# Patient Record
Sex: MALE | Race: WHITE | NOT HISPANIC OR LATINO | Employment: FULL TIME | URBAN - METROPOLITAN AREA
[De-identification: names, ages, dates, MRNs, and addresses within clinical notes are randomized per-mention and may not be internally consistent; named-entity substitution may affect disease eponyms.]

---

## 2022-02-01 ENCOUNTER — OFFICE VISIT (OUTPATIENT)
Dept: PHYSICAL THERAPY | Facility: CLINIC | Age: 62
End: 2022-02-01
Payer: COMMERCIAL

## 2022-02-01 DIAGNOSIS — G89.29 CHRONIC RIGHT-SIDED LOW BACK PAIN WITHOUT SCIATICA: ICD-10-CM

## 2022-02-01 DIAGNOSIS — M25.551 RIGHT HIP PAIN: Primary | ICD-10-CM

## 2022-02-01 DIAGNOSIS — M54.50 CHRONIC RIGHT-SIDED LOW BACK PAIN WITHOUT SCIATICA: ICD-10-CM

## 2022-02-01 PROCEDURE — 97112 NEUROMUSCULAR REEDUCATION: CPT | Performed by: PHYSICAL THERAPIST

## 2022-02-01 PROCEDURE — 97162 PT EVAL MOD COMPLEX 30 MIN: CPT | Performed by: PHYSICAL THERAPIST

## 2022-02-01 NOTE — PROGRESS NOTES
PT Evaluation     Today's date: 2022  Patient name: Bessie Marshall  : 1960  MRN: [id number]  Referring provider: Self, Referral  Dx:   Encounter Diagnosis     ICD-10-CM    1  Right hip pain  M25 551    2  Chronic right-sided low back pain without sciatica  M54 50     G89 29        Start Time: 1615  Stop Time: 1700  Total time in clinic (min): 45 minutes    Assessment  Assessment details: Bessie Marshall is a pleasant 62 y o  male who presents with c/c of right posterior and lateral hip pain  The primary movement problem is right hip extension abduction dysfuntion resulting in pathoanatomical symptoms of right hip pain and limiting his ability to don/doff socks, exercise or recreation, stand, walk and descend stairs  No further referral appears necessary at this time based upon examination results  The patient's greatest concerns are the pain he is experiencing, worry over not knowing what's wrong and concern at no signs of improvement  Problem List:  1) Significant limitation in right hip mobility   2) Decreased right hip and LE strength     Etiologic factors include repetitive poor body mechanics, poor lower extremity strength and poor hip mobility  Impairments: abnormal gait, abnormal or restricted ROM, activity intolerance, impaired physical strength, lacks appropriate home exercise program, pain with function, poor posture  and poor body mechanics  Understanding of Dx/Px/POC: good   Prognosis: good  Prognosis details: Positive prognostic indicators include positive attitude toward recovery  Negative prognostic indicators include chronicity of symptoms, hypertension, minimal changes in pain with movement and obesity  Goals  Short Term Goals 2-4 wks   1  Pt will be independent with initial HEP   2  Pt will decrease pain levels to >4/10 at worst   3  Pt will improve right hip mobility to equal to the left     Long Term Goals 6-8 wks  1   Pt will be able to ascend/descend stairs in reciprocal pattern   2  Pt will be able to walk >20 minutes without symptoms   3  Pt will improve FOTO score to >58 by d/c      Plan  Plan details: 2x/wk for 4 wks tapering to 1x/wk for additional 1-2 wks if necessary   Patient would benefit from: PT eval and skilled physical therapy  Referral necessary: No  Planned therapy interventions: abdominal trunk stabilization, joint mobilization, manual therapy, balance, neuromuscular re-education, patient education, postural training, therapeutic activities, therapeutic exercise and home exercise program  Frequency: 2x week  Duration in weeks: 6  Plan of Care beginning date: 2022  Plan of Care expiration date: 3/3/2022        Subjective Evaluation    History of Present Illness  Mechanism of injury: Pt presents with c/c of right hip and thigh pain  Reports symptoms began approx 9 months ago with no specific onset of injury noted  Reports pain with walking, stairs, and extended periods of weight bearing  Pain primarily in the right gluteal region with radiating pain reported into the lateral hip and anterior thigh  Had 2 cortisone injections with first one providing some relief, however, no relief with second one  Pt seen by chiropractor for one visit and was referred to PT  Currently reports difficulty walking extended periods of time, stair mobility (descending > ascending), putting on socks/shoes, and transferring sit to stand  Denies disturbed sleep, altered sensation, and change in bowel/bladder function  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[employer], which requires a lot of walking, climbing ladders, and stairs  No recent imaging     Pain  Current pain ratin  At best pain ratin  At worst pain ratin  Location: Right hip and thigh   Quality: discomfort and dull ache  Relieving factors: heat (lidocaine rub)  Aggravating factors: standing, walking, stair climbing and lifting  Progression: no change    Social Support    Employment status: working    Diagnostic Tests  No diagnostic tests performed  Treatments  Previous treatment: chiropractic and injection treatment  Current treatment: physical therapy  Patient Goals  Patient goals for therapy: decreased pain and increased strength  Patient goal: walk without pain, more flexibility, develop exercise regimen         Objective     Tenderness     Right Hip   Tenderness in the greater trochanter and iliac crest      Neurological Testing     Sensation     Lumbar   Left   Intact: light touch    Right   Intact: light touch    Active Range of Motion     Lumbar   Flexion:  WFL  Extension:  Restriction level: minimal  Left lateral flexion:  Restriction level: minimal  Right lateral flexion:  Restriction level: minimal  Left rotation:  WFL  Right rotation:  WFL  Left Hip   Normal active range of motion    Additional Active Range of Motion Details  Decreased pain with hamstring and hip IR mobility with associated pain   Mechanical Assessment    Cervical      Thoracic      Lumbar    Standing flexion: repeated movements   Pain level: increased  Standing extension: repeated movements  Pain location: no change  Right sidegliding: repeated movements  Pain location: no change    Strength/Myotome Testing     Right Hip   Planes of Motion   Extension: 4  Abduction: 4+  Adduction: 4+    Tests     Lumbar     Left   Negative crossed SLR and passive SLR  Right   Negative crossed SLR and passive SLR  Right Hip   Negative AGUSTINA  Ambulation     Observational Gait   Gait: antalgic   Decreased walking speed, right stance time and right step length     Right foot contact pattern: foot flat             Precautions: HTN      Manuals 2/1            Lumbar PA Mobs Gr1-2             Right Hip LAD             Right Hip Manual Stretch                          Neuro Re-Ed             TA Brace             TB Anti Rotation              Prone Alt LE                                                    Education  S/S, POC Ther Ex             Bike              Squat              Standing Hip Abd/Ext              Bridge              LTR             Hip Add Iso             Clamshell                           Ther Activity                                       Gait Training                                       Modalities

## 2022-02-04 ENCOUNTER — OFFICE VISIT (OUTPATIENT)
Dept: PHYSICAL THERAPY | Facility: CLINIC | Age: 62
End: 2022-02-04
Payer: COMMERCIAL

## 2022-02-04 DIAGNOSIS — G89.29 CHRONIC RIGHT-SIDED LOW BACK PAIN WITHOUT SCIATICA: ICD-10-CM

## 2022-02-04 DIAGNOSIS — M54.50 CHRONIC RIGHT-SIDED LOW BACK PAIN WITHOUT SCIATICA: ICD-10-CM

## 2022-02-04 DIAGNOSIS — M25.551 RIGHT HIP PAIN: Primary | ICD-10-CM

## 2022-02-04 PROCEDURE — 97110 THERAPEUTIC EXERCISES: CPT | Performed by: PHYSICAL THERAPIST

## 2022-02-04 PROCEDURE — 97112 NEUROMUSCULAR REEDUCATION: CPT | Performed by: PHYSICAL THERAPIST

## 2022-02-04 PROCEDURE — 97140 MANUAL THERAPY 1/> REGIONS: CPT | Performed by: PHYSICAL THERAPIST

## 2022-02-04 NOTE — PROGRESS NOTES
Daily Note     Today's date: 2022  Patient name: Vladimir Otero  : 1960  MRN: [id number]  Referring provider: Self, Referral  Dx:   Encounter Diagnosis     ICD-10-CM    1  Right hip pain  M25 551    2  Chronic right-sided low back pain without sciatica  M54 50     G89 29        Start Time: 1515  Stop Time: 1600  Total time in clinic (min): 45 minutes    Subjective: Pt reports he had some soreness in the low back at the start of treatment  Objective: See treatment diary below      Assessment: Initiated TE program  Tolerated treatment well  Discomfort noted in the right hip with bridges, decreased with rest  No pain reported with other exercises  Decreased pain and improved gait mechanics noted following LAD of the right hip  Progress as tolerated  Patient would benefit from continued PT      Plan: Continue per plan of care        Precautions: HTN      Manuals 2/ 2/4           Lumbar PA Mobs Gr1-2  KCB           Right Hip LAD  KCB           Right Hip Manual Stretch                          Neuro Re-Ed             TA Brace  20x3" w/ PBall           TB Anti Rotation   10x GTB           Prone Alt LE  10x                                                   Education  S/S, POC            Ther Ex             Bike   L1 5'            Squat              Standing Hip Abd/Ext   20x B           Bridge   20x            LTR  10x w/ Pball           Hip Add Iso  20x3"           Clamshell   20x3" iso           PPU  10x                         Ther Activity                                       Gait Training                                       Modalities

## 2022-02-07 ENCOUNTER — OFFICE VISIT (OUTPATIENT)
Dept: PHYSICAL THERAPY | Facility: CLINIC | Age: 62
End: 2022-02-07
Payer: COMMERCIAL

## 2022-02-07 DIAGNOSIS — G89.29 CHRONIC RIGHT-SIDED LOW BACK PAIN WITHOUT SCIATICA: ICD-10-CM

## 2022-02-07 DIAGNOSIS — M25.551 RIGHT HIP PAIN: Primary | ICD-10-CM

## 2022-02-07 DIAGNOSIS — M54.50 CHRONIC RIGHT-SIDED LOW BACK PAIN WITHOUT SCIATICA: ICD-10-CM

## 2022-02-07 PROCEDURE — 97110 THERAPEUTIC EXERCISES: CPT | Performed by: PHYSICAL THERAPIST

## 2022-02-07 PROCEDURE — 97140 MANUAL THERAPY 1/> REGIONS: CPT | Performed by: PHYSICAL THERAPIST

## 2022-02-07 PROCEDURE — 97112 NEUROMUSCULAR REEDUCATION: CPT | Performed by: PHYSICAL THERAPIST

## 2022-02-07 NOTE — PROGRESS NOTES
Daily Note     Today's date: 2022  Patient name: Vladimir Otero  : 1960  MRN: [id number]  Referring provider: Self, Referral  Dx:   Encounter Diagnosis     ICD-10-CM    1  Right hip pain  M25 551    2  Chronic right-sided low back pain without sciatica  M54 50     G89 29        Start Time: 1700  Stop Time: 1750  Total time in clinic (min): 50 minutes    Subjective: Pt reports no significant increase in symptoms or pain following previous PT session  Objective: See treatment diary below      Assessment: Tolerated treatment well  Continues to report relief with LAD distraction of the right hip, however, no significant change noted with gait mechanics  Noted some discomfort in the left hip with standing hip abd  Tolerated all other exercises well  Continue to progress as tolerated  Patient would benefit from continued PT      Plan: Continue per plan of care        Precautions: HTN      Manuals  2          Lumbar PA Mobs Gr1-2  KCB KCB          Right Hip LAD  KCB KCB          Right Hip Manual Stretch                          Neuro Re-Ed             TA Brace  20x3" w/ PBall 20x3" w/ PBall          TB Anti Rotation   10x GTB 10x GTB          Prone Alt LE  10x  10x           TB Chop    10x GTB                                    Education  S/S, POC            Ther Ex             Bike   L1 5'  L1 7'           Squat              Standing Hip Abd/Ext   20x B 20x B          Bridge   20x  20x           LTR  10x w/ Pball 10x w/ Pball          Hip Add Iso  20x3" 20x3"          Clamshell   20x3" iso 20x3" iso          PPU  10x                         Ther Activity                                       Gait Training                                       Modalities

## 2022-02-10 ENCOUNTER — OFFICE VISIT (OUTPATIENT)
Dept: PHYSICAL THERAPY | Facility: CLINIC | Age: 62
End: 2022-02-10
Payer: COMMERCIAL

## 2022-02-10 DIAGNOSIS — M54.50 CHRONIC RIGHT-SIDED LOW BACK PAIN WITHOUT SCIATICA: ICD-10-CM

## 2022-02-10 DIAGNOSIS — G89.29 CHRONIC RIGHT-SIDED LOW BACK PAIN WITHOUT SCIATICA: ICD-10-CM

## 2022-02-10 DIAGNOSIS — M25.551 RIGHT HIP PAIN: Primary | ICD-10-CM

## 2022-02-10 PROCEDURE — 97140 MANUAL THERAPY 1/> REGIONS: CPT | Performed by: PHYSICAL THERAPIST

## 2022-02-10 PROCEDURE — 97110 THERAPEUTIC EXERCISES: CPT | Performed by: PHYSICAL THERAPIST

## 2022-02-10 PROCEDURE — 97112 NEUROMUSCULAR REEDUCATION: CPT | Performed by: PHYSICAL THERAPIST

## 2022-02-10 NOTE — PROGRESS NOTES
Daily Note     Today's date: 2/10/2022  Patient name: Eston Mcardle  : 1960  MRN: [id number]  Referring provider: Self, Referral  Dx:   Encounter Diagnosis     ICD-10-CM    1  Right hip pain  M25 551    2  Chronic right-sided low back pain without sciatica  M54 50     G89 29        Start Time: 1700  Stop Time: 1745  Total time in clinic (min): 45 minutes    Subjective: Pt reports he saw chiropractor on Wednesday and will continue to see him every other week  No significant increase noted in symptoms following last PT visit  Objective: See treatment diary below      Assessment: Tolerated treatment well  Discomfort noted with standing hip abd, L>R  No significant pain reported with other exercises  Continues to report relief with LAD of the right hip  Progress as tolerated  Patient would benefit from continued PT      Plan: Continue per plan of care        Precautions: HTN      Manuals 2/1 2/4 2/7 2/10         Lumbar PA Mobs Gr1-2  KCB KCB KCB         Right Hip LAD  KCB KCB KCB         Right Hip Manual Stretch                          Neuro Re-Ed             TA Brace  20x3" w/ PBall 20x3" w/ PBall 20x3" w/ PBall         TB Anti Rotation   10x GTB 10x GTB 10x GTB         Prone Alt LE  10x  10x           TB Chop    10x GTB 10x GTB                                   Education  S/S, POC            Ther Ex             Bike   L1 5'  L1 7'  L1 8'          Squat              Standing Hip Abd/Ext   20x B 20x B 20x B         Bridge   20x  20x  20x          LTR  10x w/ Pball 10x w/ Pball          Hip Add Iso  20x3" 20x3" 20x3"         Clamshell   20x3" iso 20x3" iso 20x3" iso         PPU  10x                         Ther Activity                                       Gait Training                                       Modalities

## 2022-02-14 ENCOUNTER — OFFICE VISIT (OUTPATIENT)
Dept: PHYSICAL THERAPY | Facility: CLINIC | Age: 62
End: 2022-02-14
Payer: COMMERCIAL

## 2022-02-14 DIAGNOSIS — M25.551 RIGHT HIP PAIN: Primary | ICD-10-CM

## 2022-02-14 DIAGNOSIS — G89.29 CHRONIC RIGHT-SIDED LOW BACK PAIN WITHOUT SCIATICA: ICD-10-CM

## 2022-02-14 DIAGNOSIS — M54.50 CHRONIC RIGHT-SIDED LOW BACK PAIN WITHOUT SCIATICA: ICD-10-CM

## 2022-02-14 PROCEDURE — 97112 NEUROMUSCULAR REEDUCATION: CPT | Performed by: PHYSICAL THERAPIST

## 2022-02-14 PROCEDURE — 97110 THERAPEUTIC EXERCISES: CPT | Performed by: PHYSICAL THERAPIST

## 2022-02-14 PROCEDURE — 97140 MANUAL THERAPY 1/> REGIONS: CPT | Performed by: PHYSICAL THERAPIST

## 2022-02-14 NOTE — PROGRESS NOTES
Daily Note     Today's date: 2022  Patient name: Vladimir Otero  : 1960  MRN: [id number]  Referring provider: Self, Referral  Dx:   Encounter Diagnosis     ICD-10-CM    1  Right hip pain  M25 551    2  Chronic right-sided low back pain without sciatica  M54 50     G89 29        Start Time: 1700  Stop Time: 1800  Total time in clinic (min): 60 minutes    Subjective: Pt reports he did not do much activity over the weekend and noticed decreased pain in the low back  Notes increased pain in the anterior hip and thigh after work today  Objective: See treatment diary below      Assessment: Tolerated treatment well  Adjusted POC to address pt's concerns of anterior hip pain  Noted some improvement with hip flexor stretch and lumbar extension  Updated HEP with stretches performed during today's session  Progress as tolerated  Educated the patient on progress made thus far with PT, options to discuss with PCP concerning increased pain in the anterior hip and thigh, treatment options, and POC going forward  Pt's questions answered to his satisfaction  Patient would benefit from continued PT      Plan: Continue per plan of care        Precautions: HTN      Manuals 2/1 2/4 2/7 2/10 2/14        Lumbar PA Mobs Gr1-2  KCB KCB KCB         Right Hip LAD  KCB KCB KCB         Right Hip Manual Stretch     KCB        Right Hip Anterior Glide      KCB                     Neuro Re-Ed             TA Brace  20x3" w/ PBall 20x3" w/ PBall 20x3" w/ PBall         TB Anti Rotation   10x GTB 10x GTB 10x GTB         Prone Alt LE  10x  10x           TB Chop    10x GTB 10x GTB                                   Education  S/S, POC    Review  POC, progress  S/S  Update HEP        Ther Ex             Bike   L1 5'  L1 7'  L1 8'  L1 5'         Squat              Standing Hip Abd/Ext   20x B 20x B 20x B         Bridge   20x  20x  20x          LTR  10x w/ Pball 10x w/ Pball          Hip Add Iso  20x3" 20x3" 20x3"         Clamshell   20x3" iso 20x3" iso 20x3" iso         PPU  10x            Standing Hip Flexor Stretch     2x10 5"        Standing Lumbar Ext      10x 5"        Prone Quad Stretch      10x 5"                     Ther Activity                                       Gait Training                                       Modalities

## 2022-02-18 ENCOUNTER — OFFICE VISIT (OUTPATIENT)
Dept: PHYSICAL THERAPY | Facility: CLINIC | Age: 62
End: 2022-02-18
Payer: COMMERCIAL

## 2022-02-18 DIAGNOSIS — M54.50 CHRONIC RIGHT-SIDED LOW BACK PAIN WITHOUT SCIATICA: ICD-10-CM

## 2022-02-18 DIAGNOSIS — G89.29 CHRONIC RIGHT-SIDED LOW BACK PAIN WITHOUT SCIATICA: ICD-10-CM

## 2022-02-18 DIAGNOSIS — M25.551 RIGHT HIP PAIN: Primary | ICD-10-CM

## 2022-02-18 PROCEDURE — 97110 THERAPEUTIC EXERCISES: CPT | Performed by: PHYSICAL THERAPIST

## 2022-02-18 PROCEDURE — 97140 MANUAL THERAPY 1/> REGIONS: CPT | Performed by: PHYSICAL THERAPIST

## 2022-02-18 NOTE — PROGRESS NOTES
Daily Note     Today's date: 2022  Patient name: Kayley Ramon  : 1960  MRN: [id number]  Referring provider: Self, Referral  Dx:   Encounter Diagnosis     ICD-10-CM    1  Right hip pain  M25 551    2  Chronic right-sided low back pain without sciatica  M54 50     G89 29        Start Time: 0900  Stop Time: 0945  Total time in clinic (min): 45 minutes    Subjective: Pt reports some pain in the right hip at the start of treatment  Objective: See treatment diary below      Assessment: Tolerated treatment well  Decreased pain and improved mobility noted following PPU w/ clinician OP  No pain reported with other exercises  Advised to continue with home exercises and stretches  Progress as tolerated  Patient would benefit from continued PT      Plan: Continue per plan of care        Precautions: HTN      Manuals 2/1 2/4 2/7 2/10 2/14 2/18       Lumbar PA Mobs Gr1-2  KCB KCB [place] w/ OP       Right Hip LAD  KCB KCB KCB         Right Hip Manual Stretch     KCB        Right Hip Anterior Glide      KCB                     Neuro Re-Ed             TA Brace  20x3" w/ PBall 20x3" w/ PBall 20x3" w/ PBall  20x3" w/ PBall       TB Anti Rotation   10x GTB 10x GTB 10x GTB         Prone Alt LE  10x  10x           TB Chop    10x GTB 10x GTB                                   Education  S/S, POC    Review  POC, progress  S/S  Update HEP        Ther Ex             Bike   L1 5'  L1 7'  L1 8'  L1 5'  L1 6'        Squat              Standing Hip Abd/Ext   20x B 20x B 20x B         Bridge   20x  20x  20x   20x        LTR  10x w/ Pball 10x w/ Pball   20x w/ Pball       Hip Add Iso  20x3" 20x3" 20x3"  20x3"        Clamshell   20x3" iso 20x3" iso 20x3" iso  20x3" iso       PPU  10x     2x10       Standing Hip Flexor Stretch     2x10 5"        Standing Lumbar Ext      10x 5"        Prone Quad Stretch      10x 5"                     Ther Activity                                       Gait Training Modalities

## 2022-02-21 ENCOUNTER — OFFICE VISIT (OUTPATIENT)
Dept: PHYSICAL THERAPY | Facility: CLINIC | Age: 62
End: 2022-02-21
Payer: COMMERCIAL

## 2022-02-21 DIAGNOSIS — G89.29 CHRONIC RIGHT-SIDED LOW BACK PAIN WITHOUT SCIATICA: ICD-10-CM

## 2022-02-21 DIAGNOSIS — M54.50 CHRONIC RIGHT-SIDED LOW BACK PAIN WITHOUT SCIATICA: ICD-10-CM

## 2022-02-21 DIAGNOSIS — M25.551 RIGHT HIP PAIN: Primary | ICD-10-CM

## 2022-02-21 PROCEDURE — 97110 THERAPEUTIC EXERCISES: CPT | Performed by: PHYSICAL THERAPIST

## 2022-02-21 PROCEDURE — 97140 MANUAL THERAPY 1/> REGIONS: CPT | Performed by: PHYSICAL THERAPIST

## 2022-02-21 NOTE — PROGRESS NOTES
Daily Note     Today's date: 2022  Patient name: Marisol Rocha  : 1960  MRN: [id number]  Referring provider: Self, Referral  Dx:   Encounter Diagnosis     ICD-10-CM    1  Right hip pain  M25 551    2  Chronic right-sided low back pain without sciatica  M54 50     G89 29        Start Time: 1115  Stop Time: 1200  Total time in clinic (min): 45 minutes    Subjective: Pt with no new complaints noted at this time  No significant change in symptoms reported since previous PT visit  Objective: See treatment diary below      Assessment: Tolerated treatment well  No significant symptoms reported with exercises  Occasional cuing required for proper exercise technique  Decreased joint mobility noted in the lumbar spine with some improvement noted with PA mobs  Pt to see PCP this afternoon and POC will be adjusted if needed  Patient would benefit from continued PT      Plan: Continue per plan of care        Precautions: HTN      Manuals 2/1 2/4 2/7 2/10 2/14 2/18 2/21      Lumbar PA Mobs Gr1-2  KCB KCB KCB  KCB w/ OP KCB w/ OP      Right Hip LAD  KCB KCB KCB         Right Hip Manual Stretch     KCB        Right Hip Anterior Glide      KCB                     Neuro Re-Ed             TA Brace  20x3" w/ PBall 20x3" w/ PBall 20x3" w/ PBall  20x3" w/ PBall 20x3" w/ PBall      TB Anti Rotation   10x GTB 10x GTB 10x GTB         Prone Alt LE  10x  10x           TB Chop    10x GTB 10x GTB                                   Education  S/S, POC    Review  POC, progress  S/S  Update HEP        Ther Ex             Bike   L1 5'  L1 7'  L1 8'  L1 5'  L1 6'  L1 6'       Squat              Standing Hip Abd/Ext   20x B 20x B 20x B         Bridge   20x  20x  20x   20x  20x      LTR  10x w/ Pball 10x w/ Pball   20x w/ Pball 20x w/ Pball      Hip Add Iso  20x3" 20x3" 20x3"  20x3"  20x3"       Clamshell   20x3" iso 20x3" iso 20x3" iso  20x3" iso 20x3" iso      PPU  10x     2x10 2x10       Standing Hip Flexor Stretch     2x10 5" Standing Lumbar Ext      10x 5"        Prone Quad Stretch      10x 5"                     Ther Activity                                       Gait Training                                       Modalities

## 2022-02-24 ENCOUNTER — OFFICE VISIT (OUTPATIENT)
Dept: PHYSICAL THERAPY | Facility: CLINIC | Age: 62
End: 2022-02-24
Payer: COMMERCIAL

## 2022-02-24 DIAGNOSIS — M54.50 CHRONIC RIGHT-SIDED LOW BACK PAIN WITHOUT SCIATICA: ICD-10-CM

## 2022-02-24 DIAGNOSIS — G89.29 CHRONIC RIGHT-SIDED LOW BACK PAIN WITHOUT SCIATICA: ICD-10-CM

## 2022-02-24 DIAGNOSIS — M25.551 RIGHT HIP PAIN: Primary | ICD-10-CM

## 2022-02-24 PROCEDURE — 97140 MANUAL THERAPY 1/> REGIONS: CPT | Performed by: PHYSICAL THERAPIST

## 2022-02-24 PROCEDURE — 97110 THERAPEUTIC EXERCISES: CPT | Performed by: PHYSICAL THERAPIST

## 2022-02-24 NOTE — PROGRESS NOTES
Daily Note     Today's date: 2022  Patient name: Tracey Barber  : 1960  MRN: [id number]  Referring provider: Self, Referral  Dx:   Encounter Diagnosis     ICD-10-CM    1  Right hip pain  M25 551    2  Chronic right-sided low back pain without sciatica  M54 50     G89 29        Start Time: 1700  Stop Time: 1745  Total time in clinic (min): 45 minutes    Subjective: Pt reports he continues to have pain in the right low back and anterior hip  Pt saw PCP on Monday, started prednisone dose genet and will f/u with orthopedic surgeon  Objective: See treatment diary below      Assessment: Tolerated treatment well  Reported improvement in anterior hip pain with hip inferior glide and decreased pain in the low back with REIL w/ clinician OP  However, reports symptom improvement is not lasting  No pain reported with exercises  Pt to hold PT at this time and will f/u as needed        Plan: Hold PT     Precautions: HTN      Manuals 2/1 2/4 2/7 2/10 2/14 2/18 2/21 2/24     Lumbar PA Mobs Gr1-2  [address] [place] w/ OP KCB w/ OP KCB w/ OP     Right Hip LAD  KCB KCB KCB         Right Hip Manual Stretch     KCB        Right Hip Anterior Glide      KCB   Inf glide  KCB                  Neuro Re-Ed             TA Brace  20x3" w/ PBall 20x3" w/ PBall 20x3" w/ PBall  20x3" w/ PBall 20x3" w/ PBall 20x3" w/ PBall     TB Anti Rotation   10x GTB 10x GTB 10x GTB         Prone Alt LE  10x  10x           TB Chop    10x GTB 10x GTB                                   Education  S/S, POC    Review  POC, progress  S/S  Update HEP        Ther Ex             Bike   L1 5'  L1 7'  L1 8'  L1 5'  L1 6'  L1 6'  L1 6'     Squat              Standing Hip Abd/Ext   20x B 20x B 20x B         Bridge   20x  20x  20x   20x  20x 20x     LTR  10x w/ Pball 10x w/ Pball   20x w/ Pball 20x w/ Pball 20x w/ Pball     Hip Add Iso  20x3" 20x3" 20x3"  20x3"  20x3"  20x3"      Clamshell   20x3" iso 20x3" iso 20x3" iso  20x3" iso 20x3" iso 20x3" iso     PPU 10x     2x10 2x10  2x10     Standing Hip Flexor Stretch     2x10 5"        Standing Lumbar Ext      10x 5"        Prone Quad Stretch      10x 5"                     Ther Activity                                       Gait Training                                       Modalities

## 2022-03-21 NOTE — PROGRESS NOTES
Pt seen for 8 total sessions of PT with last visit on 2/24/22  At time of last visit, pt requested to hold PT due to minimal progress made  Pt to f/u with ortho surgeon and contact clinic if additional PT services were required   Pt has not contacted clinic at this point and will be d/c from PT

## 2022-07-22 ENCOUNTER — HOSPITAL ENCOUNTER (OUTPATIENT)
Dept: ULTRASOUND IMAGING | Facility: HOSPITAL | Age: 62
Discharge: HOME | End: 2022-07-22
Payer: COMMERCIAL

## 2022-07-22 DIAGNOSIS — R31.1 BENIGN ESSENTIAL MICROSCOPIC HEMATURIA: ICD-10-CM

## 2022-07-22 PROCEDURE — 76770 US EXAM ABDO BACK WALL COMP: CPT

## 2022-08-18 ENCOUNTER — HOSPITAL ENCOUNTER (OUTPATIENT)
Dept: RADIOLOGY | Age: 62
Discharge: HOME | End: 2022-08-18
Payer: COMMERCIAL

## 2022-08-18 DIAGNOSIS — R82.991 HYPOCITRATURIA: ICD-10-CM

## 2022-08-18 DIAGNOSIS — R31.1 BENIGN ESSENTIAL MICROSCOPIC HEMATURIA: ICD-10-CM

## 2022-08-18 PROCEDURE — G1004 CDSM NDSC: HCPCS

## 2022-08-18 PROCEDURE — 74178 CT ABD&PLV WO CNTR FLWD CNTR: CPT

## 2022-08-18 RX ADMIN — IOHEXOL 70 ML: 350 INJECTION, SOLUTION INTRAVENOUS at 09:31

## 2022-11-11 ENCOUNTER — HOSPITAL ENCOUNTER (OUTPATIENT)
Dept: RADIOLOGY | Facility: HOSPITAL | Age: 62
Discharge: HOME | End: 2022-11-11
Attending: ORTHOPAEDIC SURGERY

## 2022-11-11 VITALS
DIASTOLIC BLOOD PRESSURE: 108 MMHG | HEART RATE: 74 BPM | HEIGHT: 73 IN | SYSTOLIC BLOOD PRESSURE: 179 MMHG | BODY MASS INDEX: 48.29 KG/M2

## 2022-11-11 DIAGNOSIS — M54.50 LUMBAR BACK PAIN: ICD-10-CM

## 2022-11-11 DIAGNOSIS — R52 PAIN: Primary | ICD-10-CM

## 2022-11-11 DIAGNOSIS — R52 PAIN: ICD-10-CM

## 2022-11-11 RX ORDER — LIDOCAINE 50 MG/G
1 PATCH TOPICAL DAILY
Qty: 30 PATCH | Refills: 0 | Status: SHIPPED | OUTPATIENT
Start: 2022-11-11

## 2022-11-11 RX ORDER — TAMSULOSIN HYDROCHLORIDE 0.4 MG/1
CAPSULE ORAL
COMMUNITY
Start: 2022-10-02

## 2022-11-11 RX ORDER — LEVOTHYROXINE SODIUM 0.03 MG/1
25 TABLET ORAL DAILY
COMMUNITY
Start: 2022-10-02

## 2022-11-11 RX ORDER — LISINOPRIL 10 MG/1
10 TABLET ORAL DAILY
COMMUNITY

## 2022-11-11 NOTE — LETTER
2022     Ousmane Hay MD  [address]    Patient: Milagros Fu   YOB: 1960   Date of Visit: 2022       Dear Dr Liliam Ang: Thank you for referring Milagros Fu to me for evaluation  Below are my notes for this consultation  If you have questions, please do not hesitate to call me  I look forward to following your patient along with you  Sincerely,        Montez Rivera MD        CC: No Recipients  Montez Rivera MD  2022 10:12 AM  Signed  NAME: Milagros Fu  : 1960  PCP: Ousmane Hay MD      Chief Complaint: low back pain    HPI:  Milagros Fu is a 65 y o  male presenting for initial visit with chief complaint of low back pain  Has had chronic LBP for many years after work injury when in 35s  Notes the last 2 years or so his back pain has increased  Initially his back pain was R>L  Had right hip replacement that decreased his right-sided pain  Notes after his right hip replacement he had a few months off of work and was taking it easy, noting improvement in his symptoms  Now since being back at work, he describes more L>R sided back pain  Pain worse when walking down steps  Prolonged walking > prolonged standing  He is able to walk >5 miles a day  Notes pain radiates from his low back into his hips  Denies radiation into his legs, numbness/tingling, or maggy weakness  Picking box up from floor causes more pain than lifting box from table and bringing overhead  Denies any maggy trauma  Denies fever or chills  Denies any bladder or bowel changes  PMH total hip replacement on 22 at [facility]  MRI lumbar on 2021, images not available for review at today's visit  Used to smoke, quit about 3 years ago  Conservative therapy includes the following:   Prednisone, tramadol in the past with some relief  Currently using lidocaine patches with relief    Injection by Dr Michael Darnell on 22 at [facility], right L2-3 TFESI without relief  Right hip injections 2/5/21 and 8/30/21m, the first injection helped and second injection without benefit  Has been treated with physical therapy in the past without relief  Chiropractor with some relief  Went for spinal decompression recently at chiropractor with some relief  These therapeutic modalities were ineffective  The patient has noticed significant impairment in performing the following ADLs: Sarthak Lott works as a [employer] and has a job in [place], commuting over 100 miles each way  He was out of work for 2 months or so after his hip replacement and notes his symptoms improved  He has been back at work since  Patient is able to function independently and perform ADLs  FAMILY HISTORY   History reviewed  No pertinent family history  PAST MEDICAL HISTORY:   Past Medical History:   Diagnosis Date   • Hypertension    • RACH (obstructive sleep apnea)        MEDICATIONS:  Current Outpatient Medications   Medication Sig Dispense Refill   • levothyroxine 25 mcg tablet Take 25 mcg by mouth daily     • lidocaine (Lidoderm) 5 % Apply 1 patch topically daily Remove & Discard patch within 12 hours or as directed by MD  Do not wear patch for greater than 12 hours at a time (12 hours on, 12 hours off)  30 patch 0   • lisinopril (ZESTRIL) 10 mg tablet Take 10 mg by mouth daily     • tamsulosin (FLOMAX) 0 4 mg take 1 capsule by mouth nightly     • ibuprofen (MOTRIN) 200 mg tablet Take by mouth every 6 (six) hours as needed for mild pain     • loratadine (CLARITIN) 10 mg tablet Take 10 mg by mouth daily       No current facility-administered medications for this visit         PAST SURGICAL HISTORY:  Past Surgical History:   Procedure Laterality Date   • KNEE SURGERY Left     Arthroscopy as a child   • SOFT PALATE REVISION         SOCIAL HISTORY:  Social History     Socioeconomic History   • Marital status: /Civil Union     Spouse name: Not on file   • Number of children: Not on file   • Years of education: Not on file   • Highest education level: Not on file   Occupational History   • Not on file   Tobacco Use   • Smoking status: Never Smoker   • Smokeless tobacco: Current User   • Tobacco comment: red patches   Vaping Use   • Vaping Use: Never used   Substance and Sexual Activity   • Alcohol use: Yes   • Drug use: Never   • Sexual activity: Not on file   Other Topics Concern   • Not on file   Social History Narrative    Consumes 3 cups of coffee per day     Social Determinants of Health     Financial Resource Strain: Not on file   Food Insecurity: Not on file   Transportation Needs: Not on file   Physical Activity: Not on file   Stress: Not on file   Social Connections: Not on file   Intimate Partner Violence: Not on file   Housing Stability: Not on file       ALLERGIES:  No Known Allergies    ROS:   Constitutional:  No fever, chills, night sweats, loss of appetite   HEENT No no sore throat, difficulty swallowing   Cardiovascular:  No chest pain, palpitations, BLE edema, KRAMER   Respiratory:  No SOB, coughing, chest congestion   Gastrointestinal:  No nausea, vomiting, abdominal pain   Genitourinary:  No dysuria, hematuria, urinary urgency/frequency   Musculoskeletal:  See HPI   Skin:  No rash, erythema, edema   Neurologic:  See HPI   Psychiatric Illness:  No depression, anxiety, mood disorder, substance abuse disorder     PHYSICAL EXAM:  BP (!) 179/108 Comment: pt asked to stay for second reading  Pulse 74   Ht 6' 1" (1 854 m)   BMI 48 29 kg/m²           General:  Well-developed,appears well, no acute distress   Respiratory:  No SOB, no abnormal effort (use of accessory muscles)  GI / Abdominal:  Soft  No abdominal masses or tenderness  Nondistended  Gait & balance No evidence of myelopathic gait        Lumbar spine range of motion:  -Forward flexion to 90  -Extension to neutral  -Lateral bend 45 right, 45 left  -Rotation 45 right, 45 left  There is no point tenderness with palpation along the posterior cervical, thoracic, lumbar spine  Negative pain with hip ROM or provocative movements  Pegative pain pelvic compression or SI joint provocative maneuvers  Negative TTP SI/PSIS joint  +TTP adjacent to midline lumbar spine    Neurologic:    Lower Extremity Motor Function    Right  Left    Iliopsoas  5/5  5/5    Quadriceps 5/5 5/5   Tibialis anterior  5/5  5/5    EHL  5/5  5/5    Gastroc  muscle  5/5  5/5    Heel rise  5/5  5/5    Toe rise  5/5  5/5      Sensory: light touch is intact to bilateral upper and lower extremities     Reflexes:    Right Left                  Patellar 1+ 1+   Achilles 1+ 1+   Babinski neg neg     Other tests:  Straight Leg Raise: negative    IMAGING: I have personally reviewed the images and these are my findings:  Lumbar spine Xray on 11/11/2022:  Diffuse degenerative changes with loss of disc space height L3-S1, anterior osteophyte formation, facet hypertrophy and a mild degenerative scoliosis, no apparent spondylolisthesis, no obvious instability on flexion-extension radiographs      ASSESSMENT / Medical Decision Making (MDM):  65year old male with history of low back pain  No incontinence of bowel/bladder, no fevers/chills, or night sweats  Physical exam showing no focal neurologic deficits  Imaging reviewed as above  Findings most notable for degenerative changes  MRI lumbar from 01/30/2021, images not available for review at today's visit  The image report noted multilevel DDD and disc bulges with facet DJD, L2-3 moderate right NF stenosis, and L4-5 moderate-severe left NF stenosis  Impression of lumbar degenerative disease, facet arthropathy  Plan: The above clinical, physical and imaging findings were reviewed with the patient  Layne Munoz  has a constellation of findings consistent with lumbar myofascial pain, facetogenic pain in the setting of lumbar degenerative disease  Would recommend continued non operative treatment  Patient is not surgical candidate at this time  Referral to pain management for evaluation and treatment  Discussed potential role of steroid injection at or near the source of pain to provide targeted relief  Continue with over the counter medications and lidocaine patches as prescribed if providing pain/symptom control  Discussed importance of maintaining a healthy weight/BMI and how increased weight can directly impact the normal biomechanics and load on the spine resulting in increased pain/symptoms  Patient instructed to return to office/ER sooner if symptoms are not improving, getting worse, or new worrisome/neurologic symptoms arise

## 2022-11-11 NOTE — PROGRESS NOTES
NAME: Rick Palma  : 1960  PCP: Erika Groves MD      Chief Complaint: low back pain    HPI:  Rick Palma is a 65 y o  male presenting for initial visit with chief complaint of low back pain  Has had chronic LBP for many years after work injury when in 35s  Notes the last 2 years or so his back pain has increased  Initially his back pain was R>L  Had right hip replacement that decreased his right-sided pain  Notes after his right hip replacement he had a few months off of work and was taking it easy, noting improvement in his symptoms  Now since being back at work, he describes more L>R sided back pain  Pain worse when walking down steps  Prolonged walking > prolonged standing  He is able to walk >5 miles a day  Notes pain radiates from his low back into his hips  Denies radiation into his legs, numbness/tingling, or maggy weakness  Picking box up from floor causes more pain than lifting box from table and bringing overhead  Denies any maggy trauma  Denies fever or chills  Denies any bladder or bowel changes  PMH total hip replacement on 22 at [facility]  MRI lumbar on 2021, images not available for review at today's visit  Used to smoke, quit about 3 years ago  Conservative therapy includes the following:   Prednisone, tramadol in the past with some relief  Currently using lidocaine patches with relief  Injection by Dr Delta Triplett on 22 at [facility], right L2-3 TFESI without relief  Right hip injections 21 and 21m, the first injection helped and second injection without benefit  Has been treated with physical therapy in the past without relief  Chiropractor with some relief  Went for spinal decompression recently at chiropractor with some relief  These therapeutic modalities were ineffective       The patient has noticed significant impairment in performing the following ADLs: Brandy Del Valle works as a  and has a job in [place], commuting over 100 miles each way  He was out of work for 2 months or so after his hip replacement and notes his symptoms improved  He has been back at work since  Patient is able to function independently and perform ADLs  FAMILY HISTORY   History reviewed  No pertinent family history  PAST MEDICAL HISTORY:   Past Medical History:   Diagnosis Date   • Hypertension    • RACH (obstructive sleep apnea)        MEDICATIONS:  Current Outpatient Medications   Medication Sig Dispense Refill   • levothyroxine 25 mcg tablet Take 25 mcg by mouth daily     • lidocaine (Lidoderm) 5 % Apply 1 patch topically daily Remove & Discard patch within 12 hours or as directed by MD  Do not wear patch for greater than 12 hours at a time (12 hours on, 12 hours off)  30 patch 0   • lisinopril (ZESTRIL) 10 mg tablet Take 10 mg by mouth daily     • tamsulosin (FLOMAX) 0 4 mg take 1 capsule by mouth nightly     • ibuprofen (MOTRIN) 200 mg tablet Take by mouth every 6 (six) hours as needed for mild pain     • loratadine (CLARITIN) 10 mg tablet Take 10 mg by mouth daily       No current facility-administered medications for this visit  PAST SURGICAL HISTORY:  Past Surgical History:   Procedure Laterality Date   • KNEE SURGERY Left     Arthroscopy as a child   • SOFT PALATE REVISION         SOCIAL HISTORY:  Social History     Socioeconomic History   • Marital status: /Civil Union     Spouse name: Not on file   • Number of children: Not on file   • Years of education: Not on file   • Highest education level: Not on file   Occupational History   • Not on file   Tobacco Use   • Smoking status: Never Smoker   • Smokeless tobacco: Current User   • Tobacco comment: red patches   Vaping Use   • Vaping Use: Never used   Substance and Sexual Activity   • Alcohol use:  Yes   • Drug use: Never   • Sexual activity: Not on file   Other Topics Concern   • Not on file   Social History Narrative    Consumes 3 cups of coffee per day     Social Determinants of Health Financial Resource Strain: Not on file   Food Insecurity: Not on file   Transportation Needs: Not on file   Physical Activity: Not on file   Stress: Not on file   Social Connections: Not on file   Intimate Partner Violence: Not on file   Housing Stability: Not on file       ALLERGIES:  No Known Allergies    ROS:   Constitutional:  No fever, chills, night sweats, loss of appetite   HEENT No no sore throat, difficulty swallowing   Cardiovascular:  No chest pain, palpitations, BLE edema, KRAMER   Respiratory:  No SOB, coughing, chest congestion   Gastrointestinal:  No nausea, vomiting, abdominal pain   Genitourinary:  No dysuria, hematuria, urinary urgency/frequency   Musculoskeletal:  See HPI   Skin:  No rash, erythema, edema   Neurologic:  See HPI   Psychiatric Illness:  No depression, anxiety, mood disorder, substance abuse disorder     PHYSICAL EXAM:  BP (!) 179/108 Comment: pt asked to stay for second reading  Pulse 74   Ht 6' 1" (1 854 m)   BMI 48 29 kg/m²           General:  Well-developed,appears well, no acute distress   Respiratory:  No SOB, no abnormal effort (use of accessory muscles)  GI / Abdominal:  Soft  No abdominal masses or tenderness  Nondistended  Gait & balance No evidence of myelopathic gait  Lumbar spine range of motion:  -Forward flexion to 90  -Extension to neutral  -Lateral bend 45 right, 45 left  -Rotation 45 right, 45 left  There is no point tenderness with palpation along the posterior cervical, thoracic, lumbar spine      Negative pain with hip ROM or provocative movements  Pegative pain pelvic compression or SI joint provocative maneuvers  Negative TTP SI/PSIS joint  +TTP adjacent to midline lumbar spine    Neurologic:    Lower Extremity Motor Function    Right  Left    Iliopsoas  5/5  5/5    Quadriceps 5/5 5/5   Tibialis anterior  5/5  5/5    EHL  5/5  5/5    Gastroc  muscle  5/5  5/5    Heel rise  5/5  5/5    Toe rise  5/5  5/5      Sensory: light touch is intact to bilateral upper and lower extremities     Reflexes:    Right Left                  Patellar 1+ 1+   Achilles 1+ 1+   Babinski neg neg     Other tests:  Straight Leg Raise: negative    IMAGING: I have personally reviewed the images and these are my findings:  Lumbar spine Xray on 11/11/2022:  Diffuse degenerative changes with loss of disc space height L3-S1, anterior osteophyte formation, facet hypertrophy and a mild degenerative scoliosis, no apparent spondylolisthesis, no obvious instability on flexion-extension radiographs      ASSESSMENT / Medical Decision Making (MDM):  65year old male with history of low back pain  No incontinence of bowel/bladder, no fevers/chills, or night sweats  Physical exam showing no focal neurologic deficits  Imaging reviewed as above  Findings most notable for degenerative changes  MRI lumbar from 01/30/2021, images not available for review at today's visit  The image report noted multilevel DDD and disc bulges with facet DJD, L2-3 moderate right NF stenosis, and L4-5 moderate-severe left NF stenosis  Impression of lumbar degenerative disease, facet arthropathy  Plan: The above clinical, physical and imaging findings were reviewed with the patient  Mela Gaming  has a constellation of findings consistent with lumbar myofascial pain, facetogenic pain in the setting of lumbar degenerative disease  Would recommend continued non operative treatment  Patient is not surgical candidate at this time  Referral to pain management for evaluation and treatment  Discussed potential role of steroid injection at or near the source of pain to provide targeted relief  Continue with over the counter medications and lidocaine patches as prescribed if providing pain/symptom control  Discussed importance of maintaining a healthy weight/BMI and how increased weight can directly impact the normal biomechanics and load on the spine resulting in increased pain/symptoms     Patient instructed to return to office/ER sooner if symptoms are not improving, getting worse, or new worrisome/neurologic symptoms arise

## 2022-12-01 ENCOUNTER — CONSULT (OUTPATIENT)
Dept: PAIN MEDICINE | Facility: CLINIC | Age: 62
End: 2022-12-01

## 2022-12-01 ENCOUNTER — TRANSCRIBE ORDERS (OUTPATIENT)
Dept: PAIN MEDICINE | Facility: CLINIC | Age: 62
End: 2022-12-01

## 2022-12-01 VITALS
DIASTOLIC BLOOD PRESSURE: 124 MMHG | BODY MASS INDEX: 50.13 KG/M2 | HEART RATE: 76 BPM | WEIGHT: 315 LBS | SYSTOLIC BLOOD PRESSURE: 196 MMHG

## 2022-12-01 DIAGNOSIS — G89.4 CHRONIC PAIN SYNDROME: Primary | ICD-10-CM

## 2022-12-01 DIAGNOSIS — M54.50 LUMBAR BACK PAIN: ICD-10-CM

## 2022-12-01 DIAGNOSIS — M51.16 LUMBAR DISC DISEASE WITH RADICULOPATHY: ICD-10-CM

## 2022-12-01 DIAGNOSIS — M48.061 FORAMINAL STENOSIS OF LUMBAR REGION: ICD-10-CM

## 2022-12-01 NOTE — PROGRESS NOTES
Assessment  1  Chronic pain syndrome    2  Lumbar back pain    3  Foraminal stenosis of lumbar region    4  Lumbar disc disease with radiculopathy        Plan  Mr Rodolfo Greenwood is a pleasant 43-year-old male presents to [place] [address] [facility] for initial evaluation and referral from Dr Donell Do regarding chronic low back pain with intermittent radiating symptoms into bilateral lower extremities that started when he was 30years old  He is already undergone several different approaches to manage his pain including medication management with tramadol, prednisone and Lidoderm patches as well as interventional approaches including right-sided L2-L3 TFESI under fluoro guidance in April 2022 with Dr Perez Corners also had a right hip replacement in August of 2021 all with minimal relief in his pain  During today's evaluation he is demonstrating clinical and diagnostic evidence of lumbar radiculopathy likely in relation to the multilevel degenerative disc disease and varying degrees of mild-to-moderate foraminal narrowing most notable at L2-L3 and L4-L5  At this time interventional approaches would be beneficial in warranted  As such we will plan for lumbar epidural steroid injection L4-L5 under fluoro guidance  All questions answered, patient is agreeable with plan  Complete risks and benefits including bleeding, infection, tissue reaction, nerve injury and allergic reaction were discussed  The approach was demonstrated using models and literature was provided  Verbal and written consent was obtained  My impressions and treatment recommendations were discussed in detail with the patient who verbalized understanding and had no further questions  Discharge instructions were provided  I personally saw and examined the patient and I agree with the above discussed plan of care      Orders Placed This Encounter   Procedures   • FL spine and pain procedure     Standing Status:   Future     Standing Expiration Date:   12/1/2026     Order Specific Question:   Reason for Exam:     Answer:   LESI (L4-L5)     Order Specific Question:   Anticoagulant hold needed? Answer:   No     No orders of the defined types were placed in this encounter  History of Present Illness    Francisco Newby is a 65 y o  male presents to [place] [address] [facility] for initial evaluation regarding 2 years and 6 months duration of low back pain with radiating symptoms into the left lower extremity  Patient denies any significant inciting event or recent trauma  Today reports moderate to severe pain rated 8/10 and interfering with daily activities  Pain is nearly constant 60-95% of the time that is present throughout the day and night  Describes symptoms as burning, dull aching, throbbing pain  Also reports lower extremity weakness but denies falls  Does not use any durable medical equipment for ambulation  Symptoms are worse with lying down, standing, bending, sitting, walking, exercise  Has had moderate relief with lumbar traction and heat and no significant improvements with physical therapy  Admits to smoking 1-1/2 packs per day for last 30 years and admits to alcohol use  Denies marijuana use  Previously tried tramadol, oral prednisone, naproxen, Celebrex with minimal relief  Presents today for initial evaluation  I have personally reviewed and/or updated the patient's past medical history, past surgical history, family history, social history, current medications, allergies, and vital signs today  Review of Systems   Constitutional: Positive for unexpected weight change  Negative for fever  HENT: Negative for trouble swallowing  Eyes: Negative for visual disturbance  Respiratory: Negative for shortness of breath and wheezing  Cardiovascular: Negative for chest pain and palpitations  Gastrointestinal: Negative for constipation, diarrhea, nausea and vomiting     Endocrine: Negative for cold intolerance, heat intolerance and polydipsia  Genitourinary: Positive for hematuria  Negative for difficulty urinating and frequency  Musculoskeletal: Positive for back pain, gait problem and joint swelling  Negative for arthralgias and myalgias  Skin: Negative for rash  Neurological: Negative for dizziness, seizures, syncope, weakness and headaches  Hematological: Does not bruise/bleed easily  Psychiatric/Behavioral: Negative for dysphoric mood  All other systems reviewed and are negative  There is no problem list on file for this patient  Past Medical History:   Diagnosis Date   • Hypertension    • RACH (obstructive sleep apnea)        Past Surgical History:   Procedure Laterality Date   • KNEE SURGERY Left     Arthroscopy as a child   • SOFT PALATE REVISION         No family history on file  Social History     Occupational History   • Not on file   Tobacco Use   • Smoking status: Never   • Smokeless tobacco: Current   • Tobacco comments:     red patches   Vaping Use   • Vaping Use: Never used   Substance and Sexual Activity   • Alcohol use: Yes   • Drug use: Never   • Sexual activity: Not on file       Current Outpatient Medications on File Prior to Visit   Medication Sig   • lidocaine (Lidoderm) 5 % Apply 1 patch topically daily Remove & Discard patch within 12 hours or as directed by MD  Do not wear patch for greater than 12 hours at a time (12 hours on, 12 hours off)  • tamsulosin (FLOMAX) 0 4 mg take 1 capsule by mouth nightly   • [DISCONTINUED] ibuprofen (MOTRIN) 200 mg tablet Take by mouth every 6 (six) hours as needed for mild pain   • [DISCONTINUED] levothyroxine 25 mcg tablet Take 25 mcg by mouth daily   • [DISCONTINUED] lisinopril (ZESTRIL) 10 mg tablet Take 10 mg by mouth daily   • [DISCONTINUED] loratadine (CLARITIN) 10 mg tablet Take 10 mg by mouth daily     No current facility-administered medications on file prior to visit         No Known Allergies    Physical Exam    BP Lionel Mcginnisr ) 196/124   Pulse 76   Wt (!) 172 kg (380 lb)   BMI 50 13 kg/m²     Constitutional: normal, well developed, well nourished, alert, in no distress and non-toxic and no overt pain behavior  and obese  Eyes: anicteric  HEENT: grossly intact  Neck: supple, symmetric, trachea midline and no masses   Pulmonary:even and unlabored  Cardiovascular:No edema or pitting edema present  Skin:Normal without rashes or lesions and well hydrated  Psychiatric:Mood and affect appropriate  Neurologic:Cranial Nerves II-XII grossly intact  Musculoskeletal:antalgic, tenderness to palpation bilateral lumbar paraspinals and distal to PSIS, decreased active and passive range of motion lumbar flexion and extension limited by pain, MMT 5/5 bilateral lower extremities, sensation grossly intact to light touch, DTRs hyporeflexic bilateral patellar and Achilles reflexes, positive straight leg raise in the supine position with radicular pain into the left leg    Imaging    LUMBAR SPINE     INDICATION:   R52: Pain, unspecified        COMPARISON:  None     VIEWS:  XR SPINE LUMBAR MINIMUM 4 VIEWS NON INJURY  Images: 4     FINDINGS:     There are 5 non rib bearing lumbar vertebral bodies       There is no evidence of acute fracture or destructive osseous lesion      Mild scoliotic deformity is noted  Grade 1 retrolisthesis of L2 on L3       Intervertebral disc space narrowing at L2-3 and L4-5  Endplate osteophytes present at all levels      The pedicles appear intact       Atherosclerotic calcifications      IMPRESSION:     Multilevel degenerative changes of lumbar spine with levoscoliosis

## 2023-01-13 ENCOUNTER — HOSPITAL ENCOUNTER (OUTPATIENT)
Dept: RADIOLOGY | Facility: CLINIC | Age: 63
Discharge: HOME | End: 2023-01-13
Admitting: PHYSICAL MEDICINE & REHABILITATION

## 2023-01-13 VITALS
TEMPERATURE: 97.3 F | RESPIRATION RATE: 18 BRPM | SYSTOLIC BLOOD PRESSURE: 134 MMHG | HEART RATE: 76 BPM | OXYGEN SATURATION: 93 % | DIASTOLIC BLOOD PRESSURE: 81 MMHG

## 2023-01-13 DIAGNOSIS — M51.16 LUMBAR DISC DISEASE WITH RADICULOPATHY: ICD-10-CM

## 2023-01-13 DIAGNOSIS — M54.50 LUMBAR BACK PAIN: ICD-10-CM

## 2023-01-13 DIAGNOSIS — G89.4 CHRONIC PAIN SYNDROME: ICD-10-CM

## 2023-01-13 DIAGNOSIS — M48.061 FORAMINAL STENOSIS OF LUMBAR REGION: ICD-10-CM

## 2023-01-13 RX ORDER — METHYLPREDNISOLONE ACETATE 80 MG/ML
80 INJECTION, SUSPENSION INTRA-ARTICULAR; INTRALESIONAL; INTRAMUSCULAR; PARENTERAL; SOFT TISSUE ONCE
Status: COMPLETED | OUTPATIENT
Start: 2023-01-13 | End: 2023-01-13

## 2023-01-13 RX ADMIN — METHYLPREDNISOLONE ACETATE 80 MG: 80 INJECTION, SUSPENSION INTRA-ARTICULAR; INTRALESIONAL; INTRAMUSCULAR; PARENTERAL; SOFT TISSUE at 08:37

## 2023-01-13 RX ADMIN — IOHEXOL 1 ML: 300 INJECTION, SOLUTION INTRAVENOUS at 08:37

## 2023-01-13 NOTE — H&P
History of Present Illness: The patient is a 65 y o  male who presents with complaints of low back pain    Past Medical History:   Diagnosis Date   • Hypertension    • RACH (obstructive sleep apnea)        Past Surgical History:   Procedure Laterality Date   • KNEE SURGERY Left     Arthroscopy as a child   • SOFT PALATE REVISION           Current Outpatient Medications:   •  lidocaine (Lidoderm) 5 %, Apply 1 patch topically daily Remove & Discard patch within 12 hours or as directed by MD  Do not wear patch for greater than 12 hours at a time (12 hours on, 12 hours off)  (Patient not taking: Reported on 1/13/2023), Disp: 30 patch, Rfl: 0  •  tamsulosin (FLOMAX) 0 4 mg, take 1 capsule by mouth nightly, Disp: , Rfl:     Current Facility-Administered Medications:   •  iohexol (OMNIPAQUE) 300 mg/mL injection 50 mL, 50 mL, Epidural, Once, Kelly Romeo,   •  methylPREDNISolone acetate (DEPO-MEDROL) injection 80 mg, 80 mg, Epidural, Once, Kelly Romeo, DO    No Known Allergies    Physical Exam:   Vitals:    01/13/23 0822   BP: 147/90   Pulse: 79   Resp: 18   Temp: (!) 97 3 °F (36 3 °C)   SpO2: 91%     General: Awake, Alert, Oriented x 3, Mood and affect appropriate  Respiratory: Respirations even and unlabored  Cardiovascular: Peripheral pulses intact; no edema  Musculoskeletal Exam: Tenderness to palpation bilateral lumbar paraspinals    ASA Score: 2    Patient/Chart Verification  Patient ID Verified: Verbal  ID Band Applied: No  Consents Confirmed: Procedural  H&P( within 30 days) Verified: To be obtained in the Pre-Procedure area  Allergies Reviewed: Yes  Anticoag/NSAID held?: No  Currently on antibiotics?: No    Assessment:   1  Lumbar back pain    2  Foraminal stenosis of lumbar region    3  Chronic pain syndrome    4   Lumbar disc disease with radiculopathy        Plan: LESI (L4-L5)

## 2023-01-13 NOTE — DISCHARGE INSTR - LAB
Epidural Steroid Injection   WHAT YOU NEED TO KNOW:   An epidural steroid injection (ZACHARY) is a procedure to inject steroid medicine into the epidural space  The epidural space is between your spinal cord and vertebrae  Steroids reduce inflammation and fluid buildup in your spine that may be causing pain  You may be given pain medicine along with the steroids  ACTIVITY  Do not drive or operate machinery today  No strenuous activity today - bending, lifting, etc   You may resume normal activites starting tomorrow - start slowly and as tolerated  You may shower today, but no tub baths or hot tubs  You may have numbness for several hours from the local anesthetic  Please use caution and common sense, especially with weight-bearing activities  CARE OF THE INJECTION SITE  If you have soreness or pain, apply ice to the area today (20 minutes on/20 minutes off)  Starting tomorrow, you may use warm, moist heat or ice if needed  You may have an increase or change in your discomfort for 36-48 hours after your treatment  Apply ice and continue with any pain medication you have been prescribed  [facility] if you have any of the following: redness, drainage, swelling, headache, stiff neck or fever above 100°F     SPECIAL INSTRUCTIONS  Our office will contact you in approximately 7 days for a progress report  MEDICATIONS  Continue to take all routine medications  Our office may have instructed you to hold some medications  As no general anesthesia was used in today's procedure, you should not experience any side effects related to anesthesia  If you are diabetic, the steroids used in today's injection may temporarily increase your blood sugar levels after the first few days after your injection  Please keep a close eye on your sugars and alert the doctor who manages your diabetes if your sugars are significantly high from your baseline or you are symptomatic       If you have a problem specifically related to your procedure, please call our office at [phone]  Problems not related to your procedure should be directed to your primary care physician

## 2023-01-20 ENCOUNTER — TELEPHONE (OUTPATIENT)
Dept: RADIOLOGY | Facility: MEDICAL CENTER | Age: 63
End: 2023-01-20

## 2023-01-20 NOTE — TELEPHONE ENCOUNTER
Patient report:25 % improvement post injection    Pain Level:2-8 comes and goes /10  Getting more muscle cramps    Patient informed we will call back in one week

## 2023-02-10 ENCOUNTER — OFFICE VISIT (OUTPATIENT)
Dept: OBGYN CLINIC | Facility: HOSPITAL | Age: 63
End: 2023-02-10

## 2023-02-10 VITALS
WEIGHT: 315 LBS | HEIGHT: 73 IN | BODY MASS INDEX: 41.75 KG/M2 | SYSTOLIC BLOOD PRESSURE: 127 MMHG | DIASTOLIC BLOOD PRESSURE: 83 MMHG | HEART RATE: 76 BPM

## 2023-02-10 DIAGNOSIS — M51.36 DDD (DEGENERATIVE DISC DISEASE), LUMBAR: ICD-10-CM

## 2023-02-10 DIAGNOSIS — M54.50 LUMBAR BACK PAIN: Primary | ICD-10-CM

## 2023-02-10 RX ORDER — LISINOPRIL 10 MG/1
10 TABLET ORAL EVERY MORNING
COMMUNITY
Start: 2023-01-18

## 2023-02-10 RX ORDER — LEVOTHYROXINE SODIUM 0.03 MG/1
25 TABLET ORAL DAILY
COMMUNITY
Start: 2022-12-05

## 2023-02-10 NOTE — PROGRESS NOTES
NAME: Donell Leal  : 1960  PCP: Bonny Jacques MD      Chief Complaint: low back pain    HPI:  Donell Leal is a 65 y o  male presenting for initial visit with chief complaint of low back pain  Has had chronic LBP for many years after work injury when in 35s  Notes the last 2 years or so his back pain has increased  Initially his back pain was R>L  Had right hip replacement that decreased his right-sided pain  Notes after his right hip replacement he had a few months off of work and was taking it easy, noting improvement in his symptoms  Now since being back at work, he describes more L>R sided back pain  Pain worse when walking down steps  Prolonged walking > prolonged standing  He is able to walk >5 miles a day  Notes pain radiates from his low back into his hips  Denies radiation into his legs, numbness/tingling, or maggy weakness  Picking box up from floor causes more pain than lifting box from table and bringing overhead  Denies any maggy trauma  Denies fever or chills  Denies any bladder or bowel changes  PMH total hip replacement on 22 at [facility]  MRI lumbar on 2021, images not available for review at today's visit  Used to smoke, quit about 3 years ago  Conservative therapy includes the following:   Prednisone, tramadol in the past with some relief  Currently using lidocaine patches with relief  Injection by Dr Lawanda Holstein on 22 at [facility], right L2-3 TFESI without relief  Right hip injections 21 and 21m, the first injection helped and second injection without benefit  Has been treated with physical therapy in the past without relief  Chiropractor with some relief  Went for spinal decompression recently at chiropractor with some relief  These therapeutic modalities were ineffective       The patient has noticed significant impairment in performing the following ADLs: Ryan Abebe works as a  and has a job in [place], commuting over 100 miles each way  He was out of work for 2 months or so after his hip replacement and notes his symptoms improved  He has been back at work since  Patient is able to function independently and perform ADLs  Update 02/10/2023:  Kelly Samaniego received a L4-5 interlaminar ZACHARY on 01/13/2023 by Dr Emeterio Cleveland that provided about 3 weeks of 70% pain relief  His left leg pain is much improved  His back pain was also significantly improved by the injection but has started to wear off  Continues to work  FAMILY HISTORY   History reviewed  No pertinent family history  PAST MEDICAL HISTORY:   Past Medical History:   Diagnosis Date   • Hypertension    • RACH (obstructive sleep apnea)        MEDICATIONS:  Current Outpatient Medications   Medication Sig Dispense Refill   • lisinopril (ZESTRIL) 10 mg tablet Take 10 mg by mouth every morning     • tamsulosin (FLOMAX) 0 4 mg take 1 capsule by mouth nightly     • levothyroxine 25 mcg tablet Take 25 mcg by mouth daily (Patient not taking: Reported on 2/10/2023)     • lidocaine (Lidoderm) 5 % Apply 1 patch topically daily Remove & Discard patch within 12 hours or as directed by MD  Do not wear patch for greater than 12 hours at a time (12 hours on, 12 hours off)  (Patient not taking: Reported on 1/13/2023) 30 patch 0     No current facility-administered medications for this visit         PAST SURGICAL HISTORY:  Past Surgical History:   Procedure Laterality Date   • KNEE SURGERY Left     Arthroscopy as a child   • SOFT PALATE REVISION         SOCIAL HISTORY:  Social History     Socioeconomic History   • Marital status: /Civil Union     Spouse name: Not on file   • Number of children: Not on file   • Years of education: Not on file   • Highest education level: Not on file   Occupational History   • Not on file   Tobacco Use   • Smoking status: Never   • Smokeless tobacco: Current   • Tobacco comments:     red patches   Vaping Use   • Vaping Use: Never used   Substance and Sexual Activity   • Alcohol use: Yes   • Drug use: Never   • Sexual activity: Not on file   Other Topics Concern   • Not on file   Social History Narrative    Consumes 3 cups of coffee per day     Social Determinants of Health     Financial Resource Strain: Not on file   Food Insecurity: Not on file   Transportation Needs: Not on file   Physical Activity: Not on file   Stress: Not on file   Social Connections: Not on file   Intimate Partner Violence: Not on file   Housing Stability: Not on file       ALLERGIES:  No Known Allergies    ROS:   Constitutional:  No fever, chills, night sweats, loss of appetite   HEENT No no sore throat, difficulty swallowing   Cardiovascular:  No chest pain, palpitations, BLE edema, KRAMER   Respiratory:  No SOB, coughing, chest congestion   Gastrointestinal:  No nausea, vomiting, abdominal pain   Genitourinary:  No dysuria, hematuria, urinary urgency/frequency   Musculoskeletal:  See HPI   Skin:  No rash, erythema, edema   Neurologic:  See HPI   Psychiatric Illness:  No depression, anxiety, mood disorder, substance abuse disorder     PHYSICAL EXAM:  /83   Pulse 76   Ht 6' 1" (1 854 m)   Wt (!) 172 kg (379 lb 3 1 oz)   BMI 50 03 kg/m²           General:  Well-developed,appears well, no acute distress   Respiratory:  No SOB, no abnormal effort (use of accessory muscles)  GI / Abdominal:  Soft  No abdominal masses or tenderness  Nondistended  Gait & balance No evidence of myelopathic gait  Lumbar spine range of motion:  -Forward flexion to 90  -Extension to neutral  -Lateral bend 45 right, 45 left  -Rotation 45 right, 45 left  There is no point tenderness with palpation along the posterior cervical, thoracic, lumbar spine      Negative pain with hip ROM or provocative movements  Pegative pain pelvic compression or SI joint provocative maneuvers  Negative TTP SI/PSIS joint  +TTP adjacent to midline lumbar spine    Neurologic:    Lower Extremity Motor Function    Right  Left Iliopsoas  5/5  5/5    Quadriceps 5/5 5/5   Tibialis anterior  5/5  5/5    EHL  5/5  5/5    Gastroc  muscle  5/5  5/5    Heel rise  5/5  5/5    Toe rise  5/5  5/5      Sensory: light touch is intact to bilateral upper and lower extremities     Reflexes:    Right Left                  Patellar 1+ 1+   Achilles 1+ 1+   Babinski neg neg     Other tests:  Straight Leg Raise: negative    IMAGING: I have personally reviewed the images and these are my findings:  no new images for review at today's visit  Lumbar spine Xray on 11/11/2022:  Diffuse degenerative changes with loss of disc space height L3-S1, anterior osteophyte formation, facet hypertrophy and a mild degenerative scoliosis, no apparent spondylolisthesis, no obvious instability on flexion-extension radiographs      ASSESSMENT / Medical Decision Making (MDM):  65year old male with history of low back pain  Here for follow up low back pain after injection  No incontinence of bowel/bladder, no fevers/chills, or night sweats  Physical exam showing no focal neurologic deficits  Imaging reviewed as above  No new images for review at today's visit  Previous findings most notable for degenerative changes  MRI lumbar from 01/30/2021, images not available for review at today's visit  The image report noted multilevel DDD and disc bulges with facet DJD, L2-3 moderate right NF stenosis, and L4-5 moderate-severe left NF stenosis  Impression of lumbar degenerative disease  Plan: The above clinical, physical and imaging findings were reviewed with the patient  Stanley Edwards  has a constellation of findings consistent with lumbar myofascial pain, lumbar radiculopathy pain in the setting of lumbar degenerative disease  Stanley Edwards did have significant benefit from recent injection with sustained relief of his left leg pain, but back pain has started to return  Discussed with him the importance of lowering his BMI for multiple js reasons    If surgery is indicated in the future will need to defer until healthy BMI is achieved  Referral to Nutrition provided  Recommend continuing with injections as needed, follow up with Dr Edita Castro  Patient instructed to return to office/ER sooner if symptoms are not improving, getting worse, or new worrisome/neurologic symptoms arise

## 2023-04-03 ENCOUNTER — HOSPITAL ENCOUNTER (OUTPATIENT)
Dept: RADIOLOGY | Facility: CLINIC | Age: 63
Discharge: HOME | End: 2023-04-03
Admitting: PHYSICAL MEDICINE & REHABILITATION

## 2023-04-03 VITALS
DIASTOLIC BLOOD PRESSURE: 71 MMHG | HEART RATE: 79 BPM | RESPIRATION RATE: 18 BRPM | TEMPERATURE: 99.4 F | OXYGEN SATURATION: 92 % | SYSTOLIC BLOOD PRESSURE: 114 MMHG

## 2023-04-03 DIAGNOSIS — M54.16 LUMBAR RADICULOPATHY: ICD-10-CM

## 2023-04-03 RX ORDER — METHYLPREDNISOLONE ACETATE 80 MG/ML
80 INJECTION, SUSPENSION INTRA-ARTICULAR; INTRALESIONAL; INTRAMUSCULAR; PARENTERAL; SOFT TISSUE ONCE
Status: COMPLETED | OUTPATIENT
Start: 2023-04-03 | End: 2023-04-03

## 2023-04-03 RX ADMIN — IOHEXOL 1 ML: 300 INJECTION, SOLUTION INTRAVENOUS at 15:05

## 2023-04-03 RX ADMIN — METHYLPREDNISOLONE ACETATE 80 MG: 80 INJECTION, SUSPENSION INTRA-ARTICULAR; INTRALESIONAL; INTRAMUSCULAR; PARENTERAL; SOFT TISSUE at 15:05

## 2023-04-03 NOTE — H&P
History of Present Illness: The patient is a 65 y o  male who presents with complaints of low back pain    Past Medical History:   Diagnosis Date   • Hypertension    • RACH (obstructive sleep apnea)        Past Surgical History:   Procedure Laterality Date   • KNEE SURGERY Left     Arthroscopy as a child   • SOFT PALATE REVISION           Current Outpatient Medications:   •  levothyroxine 25 mcg tablet, Take 25 mcg by mouth daily (Patient not taking: Reported on 2/10/2023), Disp: , Rfl:   •  lidocaine (Lidoderm) 5 %, Apply 1 patch topically daily Remove & Discard patch within 12 hours or as directed by MD  Do not wear patch for greater than 12 hours at a time (12 hours on, 12 hours off)  (Patient not taking: Reported on 1/13/2023), Disp: 30 patch, Rfl: 0  •  lisinopril (ZESTRIL) 10 mg tablet, Take 10 mg by mouth every morning, Disp: , Rfl:   •  tamsulosin (FLOMAX) 0 4 mg, take 1 capsule by mouth nightly, Disp: , Rfl:     Current Facility-Administered Medications:   •  iohexol (OMNIPAQUE) 300 mg/mL injection 1 mL, 1 mL, Epidural, Once, Jack Hudson,   •  methylPREDNISolone acetate (DEPO-MEDROL) injection 80 mg, 80 mg, Epidural, Once, Jack Hudson, DO    No Known Allergies    Physical Exam:   Vitals:    04/03/23 1452   BP: 130/80   Pulse: 79   Resp: 20   Temp: 99 4 °F (37 4 °C)   SpO2: 94%     General: Awake, Alert, Oriented x 3, Mood and affect appropriate  Respiratory: Respirations even and unlabored  Cardiovascular: Peripheral pulses intact; no edema  Musculoskeletal Exam: Tenderness palpation bilateral lumbar paraspinals    ASA Score: 2    Patient/Chart Verification  Patient ID Verified: Verbal  ID Band Applied: No  Consents Confirmed: Procedural, To be obtained in the Pre-Procedure area  Interval H&P(within 24 hr) Complete (required for Outpatients and Surgery Admit only): To be obtained in the Pre-Procedure area  Allergies Reviewed:  Yes  Anticoag/NSAID held?: NA  Currently on antibiotics?: No    Assessment:   1   Lumbar radiculopathy        Plan: L4-L5 LESI

## 2023-04-03 NOTE — DISCHARGE INSTR - LAB
Epidural Steroid Injection   WHAT YOU NEED TO KNOW:   An epidural steroid injection (ZACHARY) is a procedure to inject steroid medicine into the epidural space  The epidural space is between your spinal cord and vertebrae  Steroids reduce inflammation and fluid buildup in your spine that may be causing pain  You may be given pain medicine along with the steroids  ACTIVITY  Do not drive or operate machinery today  No strenuous activity today - bending, lifting, etc   You may resume normal activites starting tomorrow - start slowly and as tolerated  You may shower today, but no tub baths or hot tubs  You may have numbness for several hours from the local anesthetic  Please use caution and common sense, especially with weight-bearing activities  CARE OF THE INJECTION SITE  If you have soreness or pain, apply ice to the area today (20 minutes on/20 minutes off)  Starting tomorrow, you may use warm, moist heat or ice if needed  You may have an increase or change in your discomfort for 36-48 hours after your treatment  Apply ice and continue with any pain medication you have been prescribed  Notify the [facility] if you have any of the following: redness, drainage, swelling, headache, stiff neck or fever above 100°F     SPECIAL INSTRUCTIONS  Our office will contact you in approximately 7 days for a progress report  MEDICATIONS  Continue to take all routine medications  Our office may have instructed you to hold some medications  As no general anesthesia was used in today's procedure, you should not experience any side effects related to anesthesia  If you are diabetic, the steroids used in today's injection may temporarily increase your blood sugar levels after the first few days after your injection  Please keep a close eye on your sugars and alert the doctor who manages your diabetes if your sugars are significantly high from your baseline or you are symptomatic       If you have a problem specifically related to your procedure, please call our office at [phone]  Problems not related to your procedure should be directed to your primary care physician

## 2023-06-06 ENCOUNTER — CLINICAL SUPPORT (OUTPATIENT)
Dept: NUTRITION | Facility: HOSPITAL | Age: 63
End: 2023-06-06
Attending: ORTHOPAEDIC SURGERY
Payer: COMMERCIAL

## 2023-06-06 VITALS — BODY MASS INDEX: 48.68 KG/M2 | WEIGHT: 315 LBS

## 2023-06-06 DIAGNOSIS — E66.01 MORBID OBESITY WITH BMI OF 50.0-59.9, ADULT (HCC): Primary | ICD-10-CM

## 2023-06-06 DIAGNOSIS — M51.36 DDD (DEGENERATIVE DISC DISEASE), LUMBAR: ICD-10-CM

## 2023-06-06 PROCEDURE — 97802 MEDICAL NUTRITION INDIV IN: CPT | Performed by: DIETITIAN, REGISTERED

## 2023-06-06 NOTE — PROGRESS NOTES
" Nutrition Assessment Form    Patient Name: Cecilia Alvarado    YOB: 1960    Sex: Male     Assessment Date: 6/6/2023  Start Time: 10 Stop Time: 11 Total Minutes: 60     Data:  Present at session: self   Parent/Patient Concerns/reason for visit: \"I've got to lose wt I have bulging disks and back issues, I need some help  \"   Medical Dx/Reason for Referral: Obesity/DDD   Past Medical History:   Diagnosis Date   • Hypertension    • RACH (obstructive sleep apnea)        Current Outpatient Medications   Medication Sig Dispense Refill   • levothyroxine 25 mcg tablet Take 25 mcg by mouth daily (Patient not taking: Reported on 2/10/2023)     • lidocaine (Lidoderm) 5 % Apply 1 patch topically daily Remove & Discard patch within 12 hours or as directed by MD  Do not wear patch for greater than 12 hours at a time (12 hours on, 12 hours off)  (Patient not taking: Reported on 1/13/2023) 30 patch 0   • lisinopril (ZESTRIL) 10 mg tablet Take 10 mg by mouth every morning     • tamsulosin (FLOMAX) 0 4 mg take 1 capsule by mouth nightly       No current facility-administered medications for this visit  Additional Meds/Supplements: turmeric   Special Learning Needs/barriers to learning/any new barriers n/a   Height: HC Readings from Last 5 Encounters:   No data found for [facility]       Weight: Wt Readings from Last 10 Encounters:   02/10/23 (!) 172 kg (379 lb 3 1 oz)   12/01/22 (!) 172 kg (380 lb)   03/06/20 (!) 166 kg (366 lb)     Estimated body mass index is 50 03 kg/m² as calculated from the following:    Height as of 2/10/23: 6' 1\" (1 854 m)  Weight as of 2/10/23: 172 kg (379 lb 3 1 oz)     Recent Weight Change: []Yes     [x]No  Amount:       Energy Needs: 2000 (15cals/kg-500cals for 1#/wk wt loss)   No Known Allergies or intolerances NKFA   Social History     Substance and Sexual Activity   Alcohol Use Yes    W/e only Saturday only 4-5 drinks regular beer couple x month if that   Social History     Tobacco Use " Smoking Status Never   Smokeless Tobacco Current   Tobacco Comments    red patches    Quit smoking 4 years ago- will smoke a cigar every now and then    Who shops? 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     Other: ie: Sleep habits/ stress level/ work habits household-lives with ?/ food security Used to be a [employer] - is not doing that now, more supervising -  40 hours/wk- minimal stress at current job     Waking up at 330 in the morning gets to work at 530 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 []Yes    [x]No  When:      Why:         Diet Hx:32oz water at night   Breakfast: 10-11  Eggs x3 cheese x2 and arriaza x2-3 coffee toast sometimes 6-4-7 bread ; brioche Croatian toast and 3 slices arriaza Diet: coffee x32oz - light cream 1/3-1/2c 1 heaping teaspoon of sugar in the fal (will drink 1/2 on the way to work) - 24-36oz coffee during day  with light cream no sugar     Lunch:  n/a 2pm kind bar- 180-200cals       Dinner:  4-5pm  22oz steak salad and potatoes; coke Ritchie sub fries and dessert; salad shake n bake peas and milk 4-5pm Dinner 2 leftover hamburgers on roll w/cheese arriaza and milk 1 5% at least [employer] 2x/wk if out cherry cobbler or ice cream     Snacks: AM -   PM -   HS -    Other Notes/ Initial Assessment:robin is here with his wife, he started ozempic couple months ago    He has had a wt issue all his life and would like to lose  It is affecting his mobility and life in general       Updated assessment (Follow up note only):           Nutrition Diagnosis:   Overweight/obesity  related to Excess energy intake as evidenced by  BMI more than normative standard for age and sex (obesity-grade III 40+)       Any change or new dx since previous visit:     Nutrition Diagnosis:   n/a      Medical Nutrition Therapy Intervention:  [x]Individualized Meal Plan-Discussed the importance of eating 3 meals daily and not skipping, and how metabolism is affected  Also discussed adding in small snacks or 5-6 small meals daily if desired vs 3 larger ones, and appropriate options and portions  Appropriate timing of meals, including eating within 1 hr of waking and eating meals slowly 20mins/meals, minimizing mindless/boredom or habitual eating, etc was also mentioned  []Understanding Lab Values   []Basic Pathophysiology of Disease []Food/Medication Interactions   []Food Diary []Exercise   [x]Lifestyle/Behavior Modification Techniques-Discussed the importance of adequate sleep, and ideal sleeping conditions, including a cool temperature 64-68 degrees F, and a dark and quiet room  Also discussed the importance of a regular and consistent sleep routine, including minimizing blue light exposure an hour before sleeping  Weekend habits should include staying fairly consistent with weekday sleep habits to minimize disruption during the week  A connection was made between getting adequate and good quality sleep and the ability to handle stress the next day, make healthy food choices, and be active   []Medication, Mechanism of Action   []Label Reading: CHO/ Na/ Fat/ other_________ []Self Blood Glucose Monitoring   []Weight/BMI Goals: gain/lose/maintain []Other -           Comprehension: []Excellent  []Very Good  [x]Good  []Fair   []Poor    Receptivity: []Excellent  []Very Good  []Good  [x]Fair   []Poor "  Expected Compliance: []Excellent  []Very Good  [x]Good  []Fair   []Poor        Goals (initial)/ Progress made on previous goals/new goals:  1   3 meals daily no skipping   2  Portions per plate method as discussed with RD   3   16 oz fluids with meals and additional 32 oz daily as well       No follow-ups on file    Labs:  CMP  No results found for: \"ALKPHOS\", \"ALT\", \"ANIONGAP\", \"AST\", \"BILITOT\", \"BUN\", \"CALCIUM\", \"CL\", \"CO2\", \"CORRECTEDCA\", \"CREATININE\", \"EGFR\", \"GLUCOSE\", \"GLUF\", \"K\", \"NA\", \"PROT\"    BMP  No results found for: \"BUN\", \"CALCIUM\", \"CL\", \"CO2\", \"CREATININE\", \"GLUCOSE\", \"K\", \"NA\"    Lipids  No results found for: \"CHOL\"  No results found for: \"HDL\"  No results found for: \"LDLCALC\"  No results found for: \"TRIG\"  No results found for: \"CHOLHDL\"    Hemoglobin A1C  Lab Results   Component Value Date    HGBA1C 6 5 (H) 02/24/2023       Fasting Glucose  No results found for: \"GLUF\"    Insulin     Thyroid  No results found for: \"Q6OWLUH\", \"J8GKBIR\", \"THYROIDAB\", \"TSH\"    Hepatic Function Panel  No results found for: \"ALKPHOS\", \"ALT\", \"AST\", \"BILITOT\", \"GGT\"    Celiac Disease Antibody Panel  No results found for: \"ENDOMYSIAL IGA\", \"GLIADIN IGA\", \"GLIADIN IGG\", \"IGA\", \"TISSUE TRANSGLUT AB\", \"TTG IGA\"   Iron  No results found for: \"FERRITIN\", \"IRON\", \"TIBC\"         Sus[address], [address] [facility] [id number]    "